# Patient Record
Sex: FEMALE | Race: WHITE | URBAN - METROPOLITAN AREA
[De-identification: names, ages, dates, MRNs, and addresses within clinical notes are randomized per-mention and may not be internally consistent; named-entity substitution may affect disease eponyms.]

---

## 2020-08-13 ENCOUNTER — OFFICE VISIT (OUTPATIENT)
Dept: URBAN - METROPOLITAN AREA CLINIC 76 | Facility: CLINIC | Age: 25
End: 2020-08-13
Payer: COMMERCIAL

## 2020-08-13 DIAGNOSIS — H52.13 MYOPIA, BILATERAL: Primary | ICD-10-CM

## 2020-08-13 PROCEDURE — 92004 COMPRE OPH EXAM NEW PT 1/>: CPT | Performed by: OPTOMETRIST

## 2020-08-13 ASSESSMENT — VISUAL ACUITY
OS: 20/20
OD: 20/20

## 2020-08-13 ASSESSMENT — INTRAOCULAR PRESSURE
OD: 16
OS: 16

## 2020-08-13 ASSESSMENT — KERATOMETRY
OD: 45.00
OS: 45.13

## 2020-08-13 NOTE — IMPRESSION/PLAN
Impression: Myopia, bilateral: H52.13. Bilateral.
NPC- normal Plan: Discussed condition. New mrx given today. Pt to call with any concerns. Patient can consider CTL's exam. Quoted $100 fit fee if patient does not have any previous CTL's info, or $50 if she has info.